# Patient Record
Sex: FEMALE | Race: WHITE | Employment: UNEMPLOYED | URBAN - METROPOLITAN AREA
[De-identification: names, ages, dates, MRNs, and addresses within clinical notes are randomized per-mention and may not be internally consistent; named-entity substitution may affect disease eponyms.]

---

## 2023-08-04 ENCOUNTER — HOSPITAL ENCOUNTER (EMERGENCY)
Age: 43
Discharge: HOME | End: 2023-08-04
Attending: EMERGENCY MEDICINE
Payer: COMMERCIAL

## 2023-08-04 VITALS
HEART RATE: 88 BPM | OXYGEN SATURATION: 96 % | BODY MASS INDEX: 35.17 KG/M2 | RESPIRATION RATE: 20 BRPM | SYSTOLIC BLOOD PRESSURE: 107 MMHG | TEMPERATURE: 98.3 F | HEIGHT: 64 IN | DIASTOLIC BLOOD PRESSURE: 72 MMHG | WEIGHT: 206 LBS

## 2023-08-04 DIAGNOSIS — K08.89 PAIN, DENTAL: ICD-10-CM

## 2023-08-04 DIAGNOSIS — K04.7 DENTAL ABSCESS: Primary | ICD-10-CM

## 2023-08-04 PROCEDURE — 99283 EMERGENCY DEPT VISIT LOW MDM: CPT

## 2023-08-04 PROCEDURE — 6370000000 HC RX 637 (ALT 250 FOR IP): Performed by: STUDENT IN AN ORGANIZED HEALTH CARE EDUCATION/TRAINING PROGRAM

## 2023-08-04 RX ORDER — AMOXICILLIN AND CLAVULANATE POTASSIUM 875; 125 MG/1; MG/1
1 TABLET, FILM COATED ORAL 2 TIMES DAILY
Qty: 20 TABLET | Refills: 0 | Status: SHIPPED | OUTPATIENT
Start: 2023-08-04 | End: 2023-08-14

## 2023-08-04 RX ORDER — AMOXICILLIN AND CLAVULANATE POTASSIUM 875; 125 MG/1; MG/1
1 TABLET, FILM COATED ORAL ONCE
Status: COMPLETED | OUTPATIENT
Start: 2023-08-04 | End: 2023-08-04

## 2023-08-04 RX ORDER — AMOXICILLIN AND CLAVULANATE POTASSIUM 875; 125 MG/1; MG/1
1 TABLET, FILM COATED ORAL 2 TIMES DAILY
Qty: 20 TABLET | Refills: 0 | Status: SHIPPED | OUTPATIENT
Start: 2023-08-04 | End: 2023-08-04 | Stop reason: SDUPTHER

## 2023-08-04 RX ORDER — OXYCODONE HYDROCHLORIDE AND ACETAMINOPHEN 5; 325 MG/1; MG/1
1 TABLET ORAL ONCE
Status: COMPLETED | OUTPATIENT
Start: 2023-08-04 | End: 2023-08-04

## 2023-08-04 RX ORDER — OXYCODONE HYDROCHLORIDE AND ACETAMINOPHEN 5; 325 MG/1; MG/1
1 TABLET ORAL EVERY 6 HOURS PRN
Qty: 12 TABLET | Refills: 0 | Status: SHIPPED | OUTPATIENT
Start: 2023-08-04 | End: 2023-08-07

## 2023-08-04 RX ADMIN — OXYCODONE AND ACETAMINOPHEN 1 TABLET: 5; 325 TABLET ORAL at 04:55

## 2023-08-04 RX ADMIN — AMOXICILLIN AND CLAVULANATE POTASSIUM 1 TABLET: 875; 125 TABLET, FILM COATED ORAL at 04:55

## 2023-08-04 ASSESSMENT — LIFESTYLE VARIABLES: HOW OFTEN DO YOU HAVE A DRINK CONTAINING ALCOHOL: NEVER

## 2023-08-04 ASSESSMENT — PAIN - FUNCTIONAL ASSESSMENT: PAIN_FUNCTIONAL_ASSESSMENT: 0-10

## 2023-08-04 ASSESSMENT — PAIN SCALES - GENERAL: PAINLEVEL_OUTOF10: 8

## 2023-08-04 ASSESSMENT — PAIN DESCRIPTION - DESCRIPTORS: DESCRIPTORS: THROBBING;PRESSURE

## 2023-08-04 ASSESSMENT — PAIN DESCRIPTION - LOCATION: LOCATION: FACE

## 2023-08-04 ASSESSMENT — PAIN DESCRIPTION - ORIENTATION: ORIENTATION: RIGHT

## 2023-08-04 NOTE — ED PROVIDER NOTES
[address] [facility]        Pt Name: Steffanie Kawasaki  MRN: [id number]  [address] [facility] 1980  Date of evaluation: 8/4/2023  Provider: Walter Cedeño MD  PCP: Ji Aguilar MD  Note Started: 5:55 AM EDT 8/4/23    CHIEF COMPLAINT       Chief Complaint   Patient presents with    Dental Pain     R tooth pain 1800 unmanageable now with OTC meds. Facial Swelling     Presenting to ED w/R facial swelling and throbbing pain. Pt denies fevers, chills, N/V.  PT reports taking 500mg Keflex around 2300. PT denies drainage. HISTORY OF PRESENT ILLNESS: 1 or more Elements   History From: Patient     Limitations to history : None    Steffanie Kawasaki is a 39 y.o. female with no reported medical history who presents to the emergency department due to dental pain and facial swelling. Patient states that she broke a tooth 2 months ago and everything has been fine up until a couple days ago. Patient states that on Wednesday night she developed pain in that area where she broke her tooth. This is in the right upper jaw. That the pain was throbbing, intermittent and nonradiating. Nothing in particular made it better or worse. There is moderate in severity. Patient did state that she was trying Tylenol and ibuprofen at home with some improvement in her pain. She did need to take a Vicodin which was an old prescription and that helped control the pain as well. This morning, patient woke up with facial swelling over her right side in her cheek region. She denied any dysphagia or odynophagia. Patient had no shortness of breath as well. She did try to make appointment with the dentist but states that her pain was worsening which is why she came to the ED for evaluation. She has no other symptoms otherwise including fever, chills, nausea, vomiting abdominal pain.   On initial assessment, patient was nontoxic-appearing and in no acute or swallowing. Patient reporting no abdominal pain no vomit she reports no headache reports no neck pain. Patient reporting no photophobia. Patient is awake alert oriented x3 heart exam normal lungs are clear patient has no trismus. There is no meningeal signs. Patient does have noted tenderness to her right upper jaw region there is noted swelling to the gum. There is noted tooth that is fractured right upper molar patient has no obvious cellulitis to her face. Patient neurologically intact here. Patient differential includes tooth ache as well as periodontal abscess patient does smoke. Patient was last evaluated 2011 in September for sinusitis. Patient migraine emergency department was medicated with Percocet also given Augmentin. We did speak to dental resident. With patient having swelling and concern for periodontal abscess. And today is Friday and patient would not have follow-up until next week they will see in the dental clinic this morning. Patient was made aware of findings and plan. Patient comfortable with plan and will wait here on hospital grounds until dental clinic is open at 730. Patient on recheck is nontoxic and in no acute distress.        Tammy Christianson MD  08/04/23 1619